# Patient Record
Sex: MALE | Race: WHITE | Employment: UNEMPLOYED | URBAN - METROPOLITAN AREA
[De-identification: names, ages, dates, MRNs, and addresses within clinical notes are randomized per-mention and may not be internally consistent; named-entity substitution may affect disease eponyms.]

---

## 2019-06-03 ENCOUNTER — OFFICE VISIT (OUTPATIENT)
Dept: ALLERGY | Facility: CLINIC | Age: 24
End: 2019-06-03
Payer: COMMERCIAL

## 2019-06-03 VITALS
WEIGHT: 215.63 LBS | TEMPERATURE: 98 F | BODY MASS INDEX: 26.81 KG/M2 | HEIGHT: 75 IN | OXYGEN SATURATION: 96 % | RESPIRATION RATE: 18 BRPM | DIASTOLIC BLOOD PRESSURE: 78 MMHG | SYSTOLIC BLOOD PRESSURE: 138 MMHG | HEART RATE: 49 BPM

## 2019-06-03 DIAGNOSIS — J30.1 SEASONAL ALLERGIC RHINITIS DUE TO POLLEN: ICD-10-CM

## 2019-06-03 DIAGNOSIS — J45.20 MILD INTERMITTENT EXTRINSIC ASTHMA WITHOUT COMPLICATION: Primary | ICD-10-CM

## 2019-06-03 PROCEDURE — 99212 OFFICE O/P EST SF 10 MIN: CPT | Performed by: ALLERGY & IMMUNOLOGY

## 2019-06-03 PROCEDURE — 99214 OFFICE O/P EST MOD 30 MIN: CPT | Performed by: ALLERGY & IMMUNOLOGY

## 2019-06-03 PROCEDURE — 94010 BREATHING CAPACITY TEST: CPT | Performed by: ALLERGY & IMMUNOLOGY

## 2019-06-03 RX ORDER — FLUTICASONE PROPIONATE 50 MCG
2 SPRAY, SUSPENSION (ML) NASAL DAILY
Qty: 3 BOTTLE | Refills: 3 | Status: SHIPPED | OUTPATIENT
Start: 2019-06-03

## 2019-06-03 RX ORDER — LEVALBUTEROL INHALATION SOLUTION 1.25 MG/3ML
1.25 SOLUTION RESPIRATORY (INHALATION) EVERY 4 HOURS PRN
Qty: 75 ML | Refills: 2 | Status: SHIPPED | OUTPATIENT
Start: 2019-06-03

## 2019-06-03 RX ORDER — ALBUTEROL SULFATE 90 UG/1
2 AEROSOL, METERED RESPIRATORY (INHALATION) EVERY 6 HOURS PRN
Qty: 3 INHALER | Refills: 0 | Status: SHIPPED | OUTPATIENT
Start: 2019-06-03 | End: 2019-08-30

## 2019-06-03 RX ORDER — BUDESONIDE 0.5 MG/2ML
0.5 INHALANT ORAL 2 TIMES DAILY
Qty: 60 AMPULE | Refills: 2 | Status: SHIPPED | OUTPATIENT
Start: 2019-06-03

## 2019-06-03 RX ORDER — LEVOCETIRIZINE DIHYDROCHLORIDE 5 MG/1
5 TABLET, FILM COATED ORAL NIGHTLY
Qty: 90 TABLET | Refills: 3 | Status: SHIPPED | OUTPATIENT
Start: 2019-06-03

## 2019-06-03 NOTE — PROGRESS NOTES
Pamella Her is a 20year old male. HPI:   Patient presents with: Allergies: Runny nose, congestion and sneezing    Patient is a 47-year-old male who presents for allergy evaluation with a chief complaint of allergies.     Review of records show juan daniel Grandfather         colon cancer   • Cancer Other         maternal side- stomach cancer      Social History: Social History    Tobacco Use      Smoking status: Never Smoker    Alcohol use: Yes    Drug use: Not on file       Medications (Active prior to tod HIVES      ROS:     Allergic/Immuno:  See HPI  Cardiovascular:  Negative for irregular heartbeat/palpitations, chest pain, edema  Constitutional:  Negative night sweats,weight loss, irritability and lethargy  Endocrine:  Negative for cold intolerance, poly Stable at this time. No active symptoms. Typical triggers can include upper respiratory infections and allergies.   No emergency room visits or prednisone in the interim    Currently using Xopenex and Pulmicort twice a day with upper respiratory infection 0     Sig: Inhale 2 puffs into the lungs every 6 (six) hours as needed for Wheezing. • Levocetirizine Dihydrochloride (XYZAL) 5 MG Oral Tab 90 tablet 3     Sig: Take 1 tablet (5 mg total) by mouth nightly.    • Fluticasone Propionate (FLONASE) 50 MCG/ACT

## 2019-06-03 NOTE — PATIENT INSTRUCTIONS
1. Asthma  Mild intermittent. Stable at this time. No active symptoms. Typical triggers can include upper respiratory infections and allergies.   No emergency room visits or prednisone in the interim    Currently using Xopenex and Pulmicort twice a day w

## 2019-08-30 NOTE — TELEPHONE ENCOUNTER
Refill requested for:   Name from pharmacy: [address] [place] [address] (200  PFS) 8.5G          Will file in chart as: PROAIR  (90 Base) MCG/ACT Inhalation Aero Soln    Sig: INHALE 2 PUFFS INTO THE LUNGS EVERY 6 HOURS AS NEEDED FOR WHEEZING    Disp:  25.5

## 2020-03-12 NOTE — TELEPHONE ENCOUNTER
Refill requested for   Name from pharmacy: [facility] (200  PFS) 8.5G          Will file in chart as: PROAIR  (90 Base) MCG/ACT Inhalation Aero Soln         Sig: INHALE 2 PUFFS INTO THE LUNGS EVERY 6 HOURS AS NEEDED FOR WHEEZING    Disp:  2

## 2020-03-12 NOTE — TELEPHONE ENCOUNTER
RN left voicemail requesting he call us back regarding his asthma status. RN provided call back number and office hours. Awaiting call back.

## 2020-03-12 NOTE — TELEPHONE ENCOUNTER
Mother called back in stating that patient is not having symptoms, she is requesting it for precautionary measures. She states that a call back is not necessary as long as refill is approved. Please advise.

## 2020-03-23 ENCOUNTER — TELEPHONE (OUTPATIENT)
Dept: ALLERGY | Facility: CLINIC | Age: 25
End: 2020-03-23

## 2020-03-23 NOTE — TELEPHONE ENCOUNTER
LM informing patient to give us a call back. We refilled RX on March 12, 2020. Will await call back on request to triage asthma symptoms.

## 2020-03-23 NOTE — TELEPHONE ENCOUNTER
Please call to inquire why patient is requesting albuterol.   Albuterol was refilled on March 12, 2020

## 2020-03-23 NOTE — TELEPHONE ENCOUNTER
Called and spoke with patient's mother, reports she did  the Albuterol issued 3/12/20 from [facility] to have one on hand for [place]. No active symptoms. They do not need any additional at this time.      Denial faxed to [facility] at [phone]-

## 2020-05-28 ENCOUNTER — HOSPITAL ENCOUNTER (OUTPATIENT)
Age: 25
Discharge: HOME | End: 2020-05-28
Attending: EMERGENCY MEDICINE
Payer: COMMERCIAL

## 2020-05-28 ENCOUNTER — APPOINTMENT (OUTPATIENT)
Dept: GENERAL RADIOLOGY | Age: 25
End: 2020-05-28
Attending: EMERGENCY MEDICINE
Payer: COMMERCIAL

## 2020-05-28 VITALS
OXYGEN SATURATION: 98 % | TEMPERATURE: 98 F | HEART RATE: 57 BPM | WEIGHT: 215.63 LBS | SYSTOLIC BLOOD PRESSURE: 134 MMHG | RESPIRATION RATE: 18 BRPM | BODY MASS INDEX: 27 KG/M2 | DIASTOLIC BLOOD PRESSURE: 77 MMHG

## 2020-05-28 DIAGNOSIS — S91.332A PUNCTURE WOUND OF PLANTAR ASPECT OF LEFT FOOT, INITIAL ENCOUNTER: Primary | ICD-10-CM

## 2020-05-28 PROCEDURE — 99213 OFFICE O/P EST LOW 20 MIN: CPT

## 2020-05-28 PROCEDURE — 90471 IMMUNIZATION ADMIN: CPT

## 2020-05-28 PROCEDURE — 99203 OFFICE O/P NEW LOW 30 MIN: CPT

## 2020-05-28 PROCEDURE — 73630 X-RAY EXAM OF FOOT: CPT | Performed by: EMERGENCY MEDICINE

## 2020-05-28 NOTE — ED PROVIDER NOTES
Patient Seen in: [address]      History   Patient presents with:  Laceration Abrasion    Stated Complaint: cut left foot on nail    HPI    Patient is a 58-year-old male with past history of asthma who presents now with a 26.95 kg/m²         Physical Exam    Constitutional: Well-developed well-nourished in no acute distress  Head: Normocephalic, no swelling or tenderness  Eyes: Nonicteric sclera, no conjunctival injection  Vascular: Easily palpable left posterior tibial pul

## 2020-06-03 ENCOUNTER — TELEPHONE (OUTPATIENT)
Dept: PODIATRY CLINIC | Facility: CLINIC | Age: 25
End: 2020-06-03

## 2020-06-03 ENCOUNTER — HOSPITAL ENCOUNTER (OUTPATIENT)
Age: 25
Discharge: HOME | End: 2020-06-03
Attending: EMERGENCY MEDICINE
Payer: COMMERCIAL

## 2020-06-03 VITALS
HEART RATE: 68 BPM | TEMPERATURE: 98 F | RESPIRATION RATE: 20 BRPM | SYSTOLIC BLOOD PRESSURE: 133 MMHG | OXYGEN SATURATION: 100 % | DIASTOLIC BLOOD PRESSURE: 65 MMHG

## 2020-06-03 DIAGNOSIS — Z51.89 VISIT FOR WOUND CHECK: Primary | ICD-10-CM

## 2020-06-03 PROCEDURE — 99214 OFFICE O/P EST MOD 30 MIN: CPT

## 2020-06-03 PROCEDURE — 99213 OFFICE O/P EST LOW 20 MIN: CPT

## 2020-06-03 RX ORDER — CLINDAMYCIN HYDROCHLORIDE 300 MG/1
300 CAPSULE ORAL 3 TIMES DAILY
Qty: 21 CAPSULE | Refills: 0 | Status: SHIPPED | OUTPATIENT
Start: 2020-06-03 | End: 2020-06-10

## 2020-06-03 NOTE — TELEPHONE ENCOUNTER
Pt was seen at [facility] today, has foot infection, requesting appt soon. Please call thank you.

## 2020-06-03 NOTE — TELEPHONE ENCOUNTER
Pt was seen at [address] [place] [address] on 05/28/20 for wound from stepping on a nail with left foot. Xrays taken. Pt declined abx from IC. Pt returned to [address] [place] [address] today for wound check on left foot. Drainage coming from foot. Given Clindamycin 300 mg. LMTCB on pt's home and cell #'s     -- Please schedule pt appt with [place] for tomorrow when pt calls back. Thank you!

## 2020-06-03 NOTE — ED PROVIDER NOTES
Patient Seen in: [address]      History   Patient presents with:  Wound Recheck    Stated Complaint: left foot laceration    HPI    Patient is a 49-year-old male with no significant past medical history presents now for Temp src Temporal   SpO2 100 %   O2 Device None (Room air)       Current:/65   Pulse 68   Temp 98.1 °F (36.7 °C) (Temporal)   Resp 20   SpO2 100%         Physical Exam    Constitutional: Well-developed well-nourished in no acute distress  Head: Nor R-0

## 2020-06-03 NOTE — ED INITIAL ASSESSMENT (HPI)
Here for wound check left plantar area, was seen here 1 week ago, states he feels fb sensation, no redness, no drainage

## 2020-08-05 ENCOUNTER — LAB ENCOUNTER (OUTPATIENT)
Dept: LAB | Facility: HOSPITAL | Age: 25
End: 2020-08-05
Attending: INTERNAL MEDICINE
Payer: COMMERCIAL

## 2020-08-05 LAB
C TRACH DNA SPEC QL NAA+PROBE: NEGATIVE
N GONORRHOEA DNA SPEC QL NAA+PROBE: NEGATIVE

## 2020-08-05 PROCEDURE — 36415 COLL VENOUS BLD VENIPUNCTURE: CPT

## 2020-08-05 PROCEDURE — 87591 N.GONORRHOEAE DNA AMP PROB: CPT

## 2020-08-05 PROCEDURE — 87491 CHLMYD TRACH DNA AMP PROBE: CPT

## 2020-08-05 PROCEDURE — 86695 HERPES SIMPLEX TYPE 1 TEST: CPT

## 2020-08-05 PROCEDURE — 87389 HIV-1 AG W/HIV-1&-2 AB AG IA: CPT

## 2020-08-05 PROCEDURE — 86696 HERPES SIMPLEX TYPE 2 TEST: CPT

## 2020-08-07 LAB
HSV 1 GLYCOPROTEIN G, IGG: NEGATIVE
HSV 2 GLYCOPROTEIN G, IGG: NEGATIVE

## 2020-08-26 DIAGNOSIS — Z20.822 SUSPECTED 2019 NOVEL CORONAVIRUS INFECTION: Primary | ICD-10-CM

## 2020-11-06 ENCOUNTER — HOSPITAL ENCOUNTER (OUTPATIENT)
Age: 25
Discharge: HOME | End: 2020-11-06
Attending: EMERGENCY MEDICINE
Payer: COMMERCIAL

## 2020-11-06 VITALS
RESPIRATION RATE: 16 BRPM | BODY MASS INDEX: 29.72 KG/M2 | TEMPERATURE: 99 F | DIASTOLIC BLOOD PRESSURE: 76 MMHG | SYSTOLIC BLOOD PRESSURE: 130 MMHG | OXYGEN SATURATION: 98 % | WEIGHT: 239 LBS | HEIGHT: 75 IN | HEART RATE: 76 BPM

## 2020-11-06 DIAGNOSIS — R30.0 DYSURIA: Primary | ICD-10-CM

## 2020-11-06 PROCEDURE — 99213 OFFICE O/P EST LOW 20 MIN: CPT | Performed by: EMERGENCY MEDICINE

## 2020-11-06 PROCEDURE — 81002 URINALYSIS NONAUTO W/O SCOPE: CPT | Performed by: EMERGENCY MEDICINE

## 2020-11-06 RX ORDER — PHENAZOPYRIDINE HYDROCHLORIDE 100 MG/1
100 TABLET, FILM COATED ORAL 3 TIMES DAILY PRN
Qty: 6 TABLET | Refills: 0 | Status: SHIPPED | OUTPATIENT
Start: 2020-11-06 | End: 2020-11-13

## 2020-11-07 NOTE — ED PROVIDER NOTES
Patient Seen in: [facility]    History   No chief complaint on file. Stated Complaint: URINARY COMPLAINT    HPI    Patient complains of urinary frequency, and dysuria that began 3 days ago.   Patient denies fever or chills, no vomiting, n 4 (four) hours as needed for Wheezing. Formaldehyde (FORMADON) 10 % Apply Externally Solution,  Apply  topically. Albuterol Sulfate HFA (PROAIR HFA) 108 (90 BASE) MCG/ACT Inhalation Aero Soln,  Inhale  into the lungs.    Levalbuterol HCl (XOPENEX) 1.25 Course     Labs Reviewed   TRICHOMONAS VAGINALIS BY [facility] POCT URINALYSIS DIPSTICK   URINE CULTURE, ROUTINE   CHLAMYDIA/GONOCOCCUS, GOLRY       MDM           MDM                         Disposition and Plan     Clinical Impression:  Dysuria  (primary enc

## 2020-11-16 DIAGNOSIS — Z20.828 SARS-ASSOCIATED CORONAVIRUS EXPOSURE: Primary | ICD-10-CM

## 2020-11-24 ENCOUNTER — TELEPHONE (OUTPATIENT)
Dept: ALLERGY | Facility: CLINIC | Age: 25
End: 2020-11-24

## 2020-11-24 NOTE — TELEPHONE ENCOUNTER
RN called patient's mother to notify her that patient has not been since since June 2019 and will need to be seen for any further refills per Dr. Jane Mathis protocol.     Patient's mother reports that he leaves for [place] on Friday and will be gone for 6 savi

## 2020-11-24 NOTE — TELEPHONE ENCOUNTER
Call reviewed and noted. Agree with triage advice provided. Patient will need follow-up appointment.   As patient was last seen in June 2019

## 2020-11-24 NOTE — TELEPHONE ENCOUNTER
Levalbuterol HCl 1.25 MG/3ML Inhalation Meeku Melecio      Patient mom calling and states son is leaving Friday morning and he have medication above but it is  and she trying to get more before he go       Please advise    [phone]    GERMÁN perera

## 2020-11-25 ENCOUNTER — APPOINTMENT (OUTPATIENT)
Dept: LAB | Age: 25
End: 2020-11-25
Attending: INTERNAL MEDICINE
Payer: COMMERCIAL

## 2020-11-25 DIAGNOSIS — Z20.828 SARS-ASSOCIATED CORONAVIRUS EXPOSURE: Primary | ICD-10-CM

## 2020-11-25 DIAGNOSIS — Z20.828 SARS-ASSOCIATED CORONAVIRUS EXPOSURE: ICD-10-CM

## 2021-08-31 ENCOUNTER — TELEPHONE (OUTPATIENT)
Dept: ALLERGY | Facility: CLINIC | Age: 26
End: 2021-08-31

## 2021-08-31 NOTE — TELEPHONE ENCOUNTER
Per mom of patient , patient has an appointment tomorrow @ 8:45AM but would like to know if patient can be seen earlier than that time due to patient work conflict, patient can not reschedule due to he is going out of town for 8 months and he needs to get

## 2021-08-31 NOTE — TELEPHONE ENCOUNTER
Spoke with mother of patient, who is on REJI. Verified patient's name and . Informed mother Dr. Preston Phillips does not have any early appointments for Wednesday, 21.  Mother verbalizes understanding and states patient will be at his 8:45 am appointment, no fu

## 2021-09-01 ENCOUNTER — OFFICE VISIT (OUTPATIENT)
Dept: ALLERGY | Facility: CLINIC | Age: 26
End: 2021-09-01
Payer: COMMERCIAL

## 2021-09-01 VITALS
SYSTOLIC BLOOD PRESSURE: 124 MMHG | OXYGEN SATURATION: 97 % | RESPIRATION RATE: 17 BRPM | DIASTOLIC BLOOD PRESSURE: 73 MMHG | HEART RATE: 64 BPM

## 2021-09-01 DIAGNOSIS — Z23 NEED FOR PROPHYLACTIC VACCINATION WITH STREPTOCOCCUS PNEUMONIAE (PNEUMOCOCCUS) AND INFLUENZA VACCINES: ICD-10-CM

## 2021-09-01 DIAGNOSIS — Z92.29 COVID-19 VACCINE SERIES COMPLETED: ICD-10-CM

## 2021-09-01 DIAGNOSIS — J30.1 SEASONAL ALLERGIC RHINITIS DUE TO POLLEN: ICD-10-CM

## 2021-09-01 DIAGNOSIS — J34.2 NASAL SEPTAL DEVIATION: ICD-10-CM

## 2021-09-01 DIAGNOSIS — J45.909 EXTRINSIC ASTHMA WITHOUT COMPLICATION, UNSPECIFIED ASTHMA SEVERITY, UNSPECIFIED WHETHER PERSISTENT: Primary | ICD-10-CM

## 2021-09-01 DIAGNOSIS — J30.81 ALLERGIC RHINITIS DUE TO ANIMALS: ICD-10-CM

## 2021-09-01 PROCEDURE — 3078F DIAST BP <80 MM HG: CPT | Performed by: ALLERGY & IMMUNOLOGY

## 2021-09-01 PROCEDURE — 3074F SYST BP LT 130 MM HG: CPT | Performed by: ALLERGY & IMMUNOLOGY

## 2021-09-01 PROCEDURE — 99214 OFFICE O/P EST MOD 30 MIN: CPT | Performed by: ALLERGY & IMMUNOLOGY

## 2021-09-01 RX ORDER — LEVALBUTEROL INHALATION SOLUTION 1.25 MG/3ML
1 SOLUTION RESPIRATORY (INHALATION) EVERY 4 HOURS PRN
Qty: 60 EACH | Refills: 2 | Status: SHIPPED | OUTPATIENT
Start: 2021-09-01

## 2021-09-01 RX ORDER — FLUTICASONE PROPIONATE 50 MCG
2 SPRAY, SUSPENSION (ML) NASAL DAILY
Qty: 3 EACH | Refills: 3 | Status: SHIPPED | OUTPATIENT
Start: 2021-09-01

## 2021-09-01 RX ORDER — LEVOCETIRIZINE DIHYDROCHLORIDE 5 MG/1
5 TABLET, FILM COATED ORAL NIGHTLY
Qty: 90 TABLET | Refills: 3 | Status: SHIPPED | OUTPATIENT
Start: 2021-09-01

## 2021-09-01 RX ORDER — BUDESONIDE 0.5 MG/2ML
0.5 INHALANT ORAL 2 TIMES DAILY
Qty: 60 EACH | Refills: 2 | Status: SHIPPED | OUTPATIENT
Start: 2021-09-01

## 2021-09-01 RX ORDER — ALBUTEROL SULFATE 90 UG/1
2 AEROSOL, METERED RESPIRATORY (INHALATION) EVERY 6 HOURS PRN
Qty: 3 EACH | Refills: 1 | Status: SHIPPED | OUTPATIENT
Start: 2021-09-01

## 2021-09-01 NOTE — PATIENT INSTRUCTIONS
1. Asthma  No ED visits or prednisone in the interim.   Denies symptoms more than 2 days/week  Triggers include upper respiratory infections for which he uses budesonide 0.5 mg twice a day    Albuterol 2 puffs every 4-6 hours as needed  Budesonide 0.5 mg at

## 2021-09-01 NOTE — PROGRESS NOTES
Shorty Boyd is a 27year old male. HPI:   Patient presents with:  Asthma: pt reports his asthma is well controlled. Pt denies using daily inhalers. Pt uses rescue inhaler and nebulizer PRN. He last used in Dec 2019.  Pt denies current flares  Allergi Mother    • Other (Other) Mother         \"family cancer syndrome\"- recommended annual colonoscopy for mom   • Diabetes Other         mggf   • Heart Disease Other         mggf   • Cancer Maternal Grandfather         colon cancer   • Cancer Other         m Wheezing. (Patient not taking: Reported on 9/1/2021 ) 75 mL 2   • budesonide 0.5 MG/2ML Inhalation Suspension Take 2 mL (0.5 mg total) by nebulization 2 (two) times daily.  (Patient not taking: Reported on 9/1/2021 ) 60 ampule 2   • budesonide (PULMICORT) 0 membranes are normal bilaterally hearing is grossly intact  Nose/Mouth/Throat: nose and throat are clear mucous membranes are moist  + NSD   Neck/Thyroid: neck is supple without adenopathy  Lymphatic: no abnormal cervical, supraclavicular or axillary adeno asthma. Patient to consider but defers at this time       Orders This Visit:  No orders of the defined types were placed in this encounter.       Meds This Visit:  Requested Prescriptions     Signed Prescriptions Disp Refills   • budesonide 0.5 MG/2ML Tahira Cormier

## 2022-06-21 ENCOUNTER — LAB ENCOUNTER (OUTPATIENT)
Dept: LAB | Facility: HOSPITAL | Age: 27
End: 2022-06-21
Attending: INTERNAL MEDICINE
Payer: COMMERCIAL

## 2022-06-21 DIAGNOSIS — Z00.01 ENCOUNTER FOR GENERAL ADULT MEDICAL EXAMINATION WITH ABNORMAL FINDINGS: Primary | ICD-10-CM

## 2022-06-21 LAB
ALBUMIN SERPL-MCNC: 4.2 G/DL (ref 3.4–5)
ALBUMIN/GLOB SERPL: 1.2 {RATIO} (ref 1–2)
ALP LIVER SERPL-CCNC: 66 U/L
ALT SERPL-CCNC: 36 U/L
ANION GAP SERPL CALC-SCNC: 9 MMOL/L (ref 0–18)
AST SERPL-CCNC: 44 U/L (ref 15–37)
BASOPHILS # BLD AUTO: 0.04 X10(3) UL (ref 0–0.2)
BASOPHILS NFR BLD AUTO: 0.7 %
BILIRUB SERPL-MCNC: 1.4 MG/DL (ref 0.1–2)
BILIRUB UR QL: NEGATIVE
BUN BLD-MCNC: 18 MG/DL (ref 7–18)
BUN/CREAT SERPL: 16.1 (ref 10–20)
CALCIUM BLD-MCNC: 9.6 MG/DL (ref 8.5–10.1)
CHLORIDE SERPL-SCNC: 103 MMOL/L (ref 98–112)
CHOLEST SERPL-MCNC: 175 MG/DL (ref ?–200)
CLARITY UR: CLEAR
CO2 SERPL-SCNC: 28 MMOL/L (ref 21–32)
COLOR UR: YELLOW
CREAT BLD-MCNC: 1.12 MG/DL
DEPRECATED RDW RBC AUTO: 42 FL (ref 35.1–46.3)
EOSINOPHIL # BLD AUTO: 0.18 X10(3) UL (ref 0–0.7)
EOSINOPHIL NFR BLD AUTO: 3.1 %
ERYTHROCYTE [DISTWIDTH] IN BLOOD BY AUTOMATED COUNT: 12.6 % (ref 11–15)
FASTING PATIENT LIPID ANSWER: YES
FASTING STATUS PATIENT QL REPORTED: YES
GLOBULIN PLAS-MCNC: 3.5 G/DL (ref 2.8–4.4)
GLUCOSE BLD-MCNC: 89 MG/DL (ref 70–99)
GLUCOSE UR-MCNC: NEGATIVE MG/DL
HCT VFR BLD AUTO: 44 %
HDLC SERPL-MCNC: 87 MG/DL (ref 40–59)
HGB BLD-MCNC: 15 G/DL
IMM GRANULOCYTES # BLD AUTO: 0.02 X10(3) UL (ref 0–1)
IMM GRANULOCYTES NFR BLD: 0.3 %
LDLC SERPL CALC-MCNC: 79 MG/DL (ref ?–100)
LEUKOCYTE ESTERASE UR QL STRIP.AUTO: NEGATIVE
LYMPHOCYTES # BLD AUTO: 1.9 X10(3) UL (ref 1–4)
LYMPHOCYTES NFR BLD AUTO: 32.6 %
MCH RBC QN AUTO: 31 PG (ref 26–34)
MCHC RBC AUTO-ENTMCNC: 34.1 G/DL (ref 31–37)
MCV RBC AUTO: 90.9 FL
MONOCYTES # BLD AUTO: 0.72 X10(3) UL (ref 0.1–1)
MONOCYTES NFR BLD AUTO: 12.4 %
NEUTROPHILS # BLD AUTO: 2.96 X10 (3) UL (ref 1.5–7.7)
NEUTROPHILS # BLD AUTO: 2.96 X10(3) UL (ref 1.5–7.7)
NEUTROPHILS NFR BLD AUTO: 50.9 %
NITRITE UR QL STRIP.AUTO: NEGATIVE
NONHDLC SERPL-MCNC: 88 MG/DL (ref ?–130)
OSMOLALITY SERPL CALC.SUM OF ELEC: 291 MOSM/KG (ref 275–295)
PH UR: 5.5 [PH] (ref 5–8)
PLATELET # BLD AUTO: 193 10(3)UL (ref 150–450)
POTASSIUM SERPL-SCNC: 4 MMOL/L (ref 3.5–5.1)
PROT SERPL-MCNC: 7.7 G/DL (ref 6.4–8.2)
RBC # BLD AUTO: 4.84 X10(6)UL
SODIUM SERPL-SCNC: 140 MMOL/L (ref 136–145)
SP GR UR STRIP: >=1.03 (ref 1–1.03)
TRIGL SERPL-MCNC: 45 MG/DL (ref 30–149)
TSI SER-ACNC: 2.37 MIU/ML (ref 0.36–3.74)
UROBILINOGEN UR STRIP-ACNC: 0.2
VLDLC SERPL CALC-MCNC: 7 MG/DL (ref 0–30)
WBC # BLD AUTO: 5.8 X10(3) UL (ref 4–11)

## 2022-06-21 PROCEDURE — 80053 COMPREHEN METABOLIC PANEL: CPT

## 2022-06-21 PROCEDURE — 84443 ASSAY THYROID STIM HORMONE: CPT

## 2022-06-21 PROCEDURE — 85025 COMPLETE CBC W/AUTO DIFF WBC: CPT

## 2022-06-21 PROCEDURE — 36415 COLL VENOUS BLD VENIPUNCTURE: CPT

## 2022-06-21 PROCEDURE — 80061 LIPID PANEL: CPT

## 2024-08-27 ENCOUNTER — LAB ENCOUNTER (OUTPATIENT)
Dept: LAB | Facility: HOSPITAL | Age: 29
End: 2024-08-27
Attending: INTERNAL MEDICINE

## 2024-08-27 DIAGNOSIS — Z00.00 ROUTINE GENERAL MEDICAL EXAMINATION AT A HEALTH CARE FACILITY: Primary | ICD-10-CM

## 2024-08-27 LAB
ALBUMIN SERPL-MCNC: 4.6 G/DL (ref 3.2–4.8)
ALBUMIN/GLOB SERPL: 1.7 {RATIO} (ref 1–2)
ALP LIVER SERPL-CCNC: 73 U/L
ALT SERPL-CCNC: 13 U/L
ANION GAP SERPL CALC-SCNC: 4 MMOL/L (ref 0–18)
AST SERPL-CCNC: 20 U/L (ref ?–34)
BASOPHILS # BLD AUTO: 0.04 X10(3) UL (ref 0–0.2)
BASOPHILS NFR BLD AUTO: 0.9 %
BILIRUB SERPL-MCNC: 3.3 MG/DL (ref 0.3–1.2)
BILIRUB UR QL: NEGATIVE
BUN BLD-MCNC: 11 MG/DL (ref 9–23)
BUN/CREAT SERPL: 9.9 (ref 10–20)
CALCIUM BLD-MCNC: 9.9 MG/DL (ref 8.7–10.4)
CHLORIDE SERPL-SCNC: 105 MMOL/L (ref 98–112)
CHOLEST SERPL-MCNC: 169 MG/DL (ref ?–200)
CLARITY UR: CLEAR
CO2 SERPL-SCNC: 30 MMOL/L (ref 21–32)
COLOR UR: COLORLESS
CREAT BLD-MCNC: 1.11 MG/DL
DEPRECATED RDW RBC AUTO: 41.7 FL (ref 35.1–46.3)
EGFRCR SERPLBLD CKD-EPI 2021: 91 ML/MIN/1.73M2 (ref 60–?)
EOSINOPHIL # BLD AUTO: 0.24 X10(3) UL (ref 0–0.7)
EOSINOPHIL NFR BLD AUTO: 5.2 %
ERYTHROCYTE [DISTWIDTH] IN BLOOD BY AUTOMATED COUNT: 12.7 % (ref 11–15)
FASTING PATIENT LIPID ANSWER: YES
FASTING STATUS PATIENT QL REPORTED: YES
GLOBULIN PLAS-MCNC: 2.7 G/DL (ref 2–3.5)
GLUCOSE BLD-MCNC: 86 MG/DL (ref 70–99)
GLUCOSE UR-MCNC: NORMAL MG/DL
HCT VFR BLD AUTO: 43.1 %
HDLC SERPL-MCNC: 70 MG/DL (ref 40–59)
HGB BLD-MCNC: 15.5 G/DL
HGB UR QL STRIP.AUTO: NEGATIVE
IMM GRANULOCYTES # BLD AUTO: 0.01 X10(3) UL (ref 0–1)
IMM GRANULOCYTES NFR BLD: 0.2 %
KETONES UR-MCNC: NEGATIVE MG/DL
LDLC SERPL CALC-MCNC: 88 MG/DL (ref ?–100)
LEUKOCYTE ESTERASE UR QL STRIP.AUTO: NEGATIVE
LYMPHOCYTES # BLD AUTO: 1.73 X10(3) UL (ref 1–4)
LYMPHOCYTES NFR BLD AUTO: 37.7 %
MCH RBC QN AUTO: 32.1 PG (ref 26–34)
MCHC RBC AUTO-ENTMCNC: 36 G/DL (ref 31–37)
MCV RBC AUTO: 89.2 FL
MONOCYTES # BLD AUTO: 0.5 X10(3) UL (ref 0.1–1)
MONOCYTES NFR BLD AUTO: 10.9 %
NEUTROPHILS # BLD AUTO: 2.07 X10 (3) UL (ref 1.5–7.7)
NEUTROPHILS # BLD AUTO: 2.07 X10(3) UL (ref 1.5–7.7)
NEUTROPHILS NFR BLD AUTO: 45.1 %
NITRITE UR QL STRIP.AUTO: NEGATIVE
NONHDLC SERPL-MCNC: 99 MG/DL (ref ?–130)
OSMOLALITY SERPL CALC.SUM OF ELEC: 287 MOSM/KG (ref 275–295)
PH UR: 6 [PH] (ref 5–8)
PLATELET # BLD AUTO: 124 10(3)UL (ref 150–450)
PLATELETS.RETICULATED NFR BLD AUTO: 6 % (ref 0–7)
POTASSIUM SERPL-SCNC: 4 MMOL/L (ref 3.5–5.1)
PROT SERPL-MCNC: 7.3 G/DL (ref 5.7–8.2)
PROT UR-MCNC: NEGATIVE MG/DL
RBC # BLD AUTO: 4.83 X10(6)UL
SODIUM SERPL-SCNC: 139 MMOL/L (ref 136–145)
SP GR UR STRIP: 1.01 (ref 1–1.03)
TRIGL SERPL-MCNC: 56 MG/DL (ref 30–149)
TSI SER-ACNC: 1.52 MIU/ML (ref 0.55–4.78)
UROBILINOGEN UR STRIP-ACNC: NORMAL
VLDLC SERPL CALC-MCNC: 9 MG/DL (ref 0–30)
WBC # BLD AUTO: 4.6 X10(3) UL (ref 4–11)

## 2024-08-27 PROCEDURE — 80061 LIPID PANEL: CPT

## 2024-08-27 PROCEDURE — 85025 COMPLETE CBC W/AUTO DIFF WBC: CPT

## 2024-08-27 PROCEDURE — 81003 URINALYSIS AUTO W/O SCOPE: CPT

## 2024-08-27 PROCEDURE — 84443 ASSAY THYROID STIM HORMONE: CPT

## 2024-08-27 PROCEDURE — 36415 COLL VENOUS BLD VENIPUNCTURE: CPT

## 2024-08-27 PROCEDURE — 80053 COMPREHEN METABOLIC PANEL: CPT

## 2025-02-03 ENCOUNTER — OFFICE VISIT (OUTPATIENT)
Dept: ALLERGY | Facility: CLINIC | Age: 30
End: 2025-02-03
Payer: COMMERCIAL

## 2025-02-03 VITALS — HEIGHT: 75 IN | WEIGHT: 220 LBS | BODY MASS INDEX: 27.35 KG/M2

## 2025-02-03 DIAGNOSIS — J30.89 SEASONAL AND PERENNIAL ALLERGIC RHINOCONJUNCTIVITIS: ICD-10-CM

## 2025-02-03 DIAGNOSIS — Z23 FLU VACCINE NEED: ICD-10-CM

## 2025-02-03 DIAGNOSIS — Z23 NEED FOR COVID-19 VACCINE: ICD-10-CM

## 2025-02-03 DIAGNOSIS — J45.20 MILD INTERMITTENT EXTRINSIC ASTHMA WITHOUT COMPLICATION (HCC): ICD-10-CM

## 2025-02-03 DIAGNOSIS — Z88.0 PENICILLIN ALLERGY: ICD-10-CM

## 2025-02-03 DIAGNOSIS — J45.990 EXERCISE INDUCED BRONCHOSPASM (HCC): Primary | ICD-10-CM

## 2025-02-03 DIAGNOSIS — J30.2 SEASONAL AND PERENNIAL ALLERGIC RHINOCONJUNCTIVITIS: ICD-10-CM

## 2025-02-03 DIAGNOSIS — H10.10 SEASONAL AND PERENNIAL ALLERGIC RHINOCONJUNCTIVITIS: ICD-10-CM

## 2025-02-03 LAB
FEF 25-75%: 4.09 L/S
FET: 12.1 S
FEV1/FVC: 0.78
FEV1: 4.9 L
FVC: 6.29 L
PEF: 943 L/MIN

## 2025-02-03 RX ORDER — CLARITHROMYCIN 500 MG/1
500 TABLET ORAL EVERY 12 HOURS
COMMUNITY
Start: 2025-01-29 | End: 2025-02-03

## 2025-02-03 RX ORDER — ALBUTEROL SULFATE 90 UG/1
2 INHALANT RESPIRATORY (INHALATION) ONCE
Status: DISCONTINUED | OUTPATIENT
Start: 2025-02-03 | End: 2025-02-03

## 2025-02-03 RX ORDER — BUDESONIDE 0.5 MG/2ML
INHALANT ORAL
Qty: 60 EACH | Refills: 5 | Status: SHIPPED | OUTPATIENT
Start: 2025-02-03

## 2025-02-03 RX ORDER — ALBUTEROL SULFATE 90 UG/1
2 INHALANT RESPIRATORY (INHALATION) EVERY 4 HOURS PRN
Qty: 3 EACH | Refills: 0 | Status: SHIPPED | OUTPATIENT
Start: 2025-02-03

## 2025-02-03 RX ORDER — AZITHROMYCIN 250 MG/1
TABLET, FILM COATED ORAL
COMMUNITY
Start: 2024-12-13

## 2025-02-03 RX ORDER — ALBUTEROL SULFATE 0.83 MG/ML
2.5 SOLUTION RESPIRATORY (INHALATION) EVERY 4 HOURS PRN
Qty: 30 EACH | Refills: 0 | Status: SHIPPED | OUTPATIENT
Start: 2025-02-03

## 2025-02-03 RX ORDER — DOXYCYCLINE 100 MG/1
TABLET ORAL 2 TIMES DAILY
COMMUNITY
Start: 2025-01-31

## 2025-02-03 NOTE — PATIENT INSTRUCTIONS
#1 asthma  Exercise-induced component.  No ED visits or prednisone in the interim.  Albuterol every 4-6 hours as needed.  Patient uses budesonide 0.5 mg twice a day at the start of an upper respiratory infection  Continue with current regimen.  Patient can be posted if using albuterol outside of exercise more than 2 days/week  See above spirometry to assess lung function    #2 allergic rhinitis  Xyzal 5 g once a day as an antihistamine if having significant runny nose sneezing itchy watery eyes  Flonase 2 sprays per nostril once a day if having prominent nasal congestion or postnasal drip  Reviewed avoidance measures and potential treatment option immunotherapy    #3 nasal septal deviation.  Able to breathe through his nose at this time.  Professional .  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    #4 penicillin allergy  Check serum IgE to penicillin V and penicillin G.  Prior reaction as a child.  No recollection.  Prior rash per report.  Reviewed 80% chance of outgrowing a penicillin allergy.  Penicillin is testing is negative then may consider oral challenge to further evaluate.    #5 acute urticaria.  Prior upper respiratory infection with fevers and wheezing.  Treated initially with Biaxin.  Started with rash while on Biaxin and then transition to doxycycline.  Continue to avoid Biaxin at this time.  In my opinion may try doxycycline again in the future.  See above #4 to reevaluate for penicillin allergy

## 2025-02-03 NOTE — PROGRESS NOTES
Jaspreet Mesa is a 29 year old male.    HPI:     Chief Complaint   Patient presents with    Asthma     Patient states he was sick last week-had wheezing, cough, had fever for a few days- used Albuterol for a few days.patient states he was given two antibiotics and broke out in hives.     Patient is a 29-year-old male who presents for allergy consultation upon referral of his PCP with a chief complaint of allergies and asthma  Review of records show patient last seen by me in September 2000 21/3+ years ago for asthma allergic rhinitis.  Prior skin testing to environmental allergies was positive to trees ragweed mold dust mite cat dog and cockroach    Immunizations reviewed COVID-vaccine x 2 doses last in June 2021      No flu vaccine on record for the past year    Today patient reports    Asthma  Active or persistent symptoms > 2 days per week    ED visits or prednisone in the interim: denies   Active meds:  alb or xopenex  prn    Needs refills on nebs       Ar:  Active or persistent symptoms  Active meds: xyzal prn seasonally , flonase prn   pets  1 dog       History of penicillin allergy, child     Biaxin 1/29/25:  for uri last week fever, cough, wz   hives 1-2 days after starting abx   Changed to doxcycline 1/31/25 , rash spred to hips and back       HISTORY:  Past Medical History:    Acute pansinusitis    Allergy    amox    Allergy    Bactrim    Asthma    admitted @ [facility]    Chronic rhinitis    Extrinsic asthma, unspecified    Pneumonia    right lower lobe/hospitalized x 2 days    Pneumothorax    bilateral- icu @ [facility] x 2 days    VARICELLA      Past Surgical History:   Procedure Laterality Date    Repair sliding inguinal hernia  09/03/97-10/23/97    right/ surgery by Dr. Gonzalez      Family History   Problem Relation Age of Onset    Allergies Mother         penicillin    Other (Hay Fever) Mother     Other (Other) Mother         \"family cancer syndrome\"- recommended annual colonoscopy for mom    Diabetes Other          mggf    Heart Disease Other         mggf    Cancer Maternal Grandfather         colon cancer    Cancer Other         maternal side- stomach cancer      Social History:   Social History     Socioeconomic History    Marital status: Single   Tobacco Use    Smoking status: Never     Passive exposure: Never    Smokeless tobacco: Never   Vaping Use    Vaping status: Never Used   Substance and Sexual Activity    Alcohol use: Yes    Drug use: Yes     Types: Cannabis        Medications (Active prior to today's visit):  Current Outpatient Medications   Medication Sig Dispense Refill    azithromycin 250 MG Oral Tab TK 2 TS PO ON DAY 1, THEN TK 1 T PO D FOR 4 DAYS      Albuterol Sulfate HFA (PROAIR HFA) 108 (90 Base) MCG/ACT Inhalation Aero Soln Inhale 2 puffs into the lungs every 6 (six) hours as needed for Wheezing. 3 each 1    Respiratory Therapy Supplies (NEBULIZER/ADULT MASK) Does not apply Kit Dispense 1 neb kit with tubing mask. 1 kit 0    budesonide (PULMICORT) 0.5 MG/2ML Inhalation Suspension USE ONE VIAL IN NEBULIZER EVERY DAY AS DIRECTED 60 mL 3    clarithromycin 500 MG Oral Tab Take 1 tablet (500 mg total) by mouth Q12H. FOR 10 DAYS (Patient not taking: Reported on 2/3/2025)      Doxycycline Monohydrate 100 MG Oral Tab Take by mouth 2 (two) times daily. (Patient not taking: Reported on 2/3/2025)      levocetirizine (XYZAL) 5 MG Oral Tab Take 1 tablet (5 mg total) by mouth nightly. (Patient not taking: Reported on 2/3/2025) 90 tablet 3    Fluticasone Propionate (FLONASE) 50 MCG/ACT Nasal Suspension 2 sprays by Nasal route daily. 3 each 3    PROAIR  (90 Base) MCG/ACT Inhalation Aero Soln INHALE 2 PUFFS INTO THE LUNGS EVERY 6 HOURS AS NEEDED FOR WHEEZING (Patient not taking: No sig reported) 25.5 g 0    Levalbuterol HCl 1.25 MG/3ML Inhalation Nebu Soln Take 3 mL (1.25 mg total) by nebulization every 4 (four) hours as needed for Wheezing. (Patient not taking: Reported on 9/1/2021 ) 75 mL 2    Fluticasone  Propionate (FLONASE) 50 MCG/ACT Nasal Suspension 2 sprays by Nasal route daily. (Patient not taking: Reported on 2/3/2025) 3 Bottle 3    Fluticasone Propionate (FLONASE) 50 MCG/ACT Nasal Suspension 2 sprays by Nasal route daily. (Patient not taking: Reported on 9/1/2021 ) 1 Bottle 11    Levalbuterol HCl (XOPENEX) 1.25 MG/3ML Inhalation Nebu Soln Take 3 mL by nebulization every 4 (four) hours as needed for Wheezing. (Patient not taking: Reported on 9/1/2021 ) 1 each 3    Formaldehyde (FORMADON) 10 % Apply Externally Solution Apply  topically. (Patient not taking: Reported on 9/1/2021)      Albuterol Sulfate HFA (PROAIR HFA) 108 (90 BASE) MCG/ACT Inhalation Aero Soln Inhale  into the lungs. (Patient not taking: Reported on 9/1/2021)      Levalbuterol HCl (XOPENEX) 1.25 MG/3ML Inhalation Nebu Soln Inhale  into the lungs. (Patient not taking: Reported on 9/1/2021 )         Allergies:  Allergies[1]      ROS:     Allergic/Immuno:  See HPI  Cardiovascular:  Negative for irregular heartbeat/palpitations, chest pain, edema  Constitutional:  Negative night sweats,weight loss, irritability and lethargy  Endocrine:  Negative for cold intolerance, polydipsia and polyphagia  ENMT:  Negative for ear drainage, hearing loss and nasal drainage  Eyes:  Negative for eye discharge and vision loss  Gastrointestinal:  Negative for abdominal pain, diarrhea and vomiting  Genitourinary:  Negative for dysuria and hematuria  Hema/Lymph:  Negative for easy bleeding and easy bruising  Integumentary:  Negative for pruritus and rash  Musculoskeletal:  Negative for joint symptoms  Neurological:  Negative for dizziness, seizures  Psychiatric:  Negative for inappropriate interaction and psychiatric symptoms  Respiratory:  Negative for cough, dyspnea and wheezing      PHYSICAL EXAM:   Constitutional: responsive, no acute distress noted  Head/Face: NC/Atraumatic  Eyes/Vision: conjunctiva and lids are normal extraocular motion is intact    Ears/Audiometry: tympanic membranes are normal bilaterally hearing is grossly intact  Nose/Mouth/Throat: nose and throat are clear mucous membranes are moist   Neck/Thyroid: neck is supple without adenopathy  Lymphatic: no abnormal cervical, supraclavicular or axillary adenopathy is noted  Respiratory: normal to inspection lungs are clear to auscultation bilaterally normal respiratory effort   Cardiovascular: regular rate and rhythm no murmurs, gallups, or rubs  Abdomen: soft non-tender non-distended  Skin/Hair: no unusual rashes present  Extremities: no edema, cyanosis, or clubbing  Neurological:Oriented to time, place, person & situation       ASSESSMENT/PLAN:   Assessment   Encounter Diagnoses   Name Primary?    Exercise induced bronchospasm (HCC) Yes    Seasonal and perennial allergic rhinoconjunctivitis     Flu vaccine need     Need for COVID-19 vaccine     Penicillin allergy        Spirometry today shows an FEV1 99%  and % which is normal with an FEV1 FVC ratio being normal as well.  Normal spirometry.  Post deferred    #1 asthma  Exercise-induced component.  No ED visits or prednisone in the interim.  Albuterol every 4-6 hours as needed.  Patient uses budesonide 0.5 mg twice a day at the start of an upper respiratory infection  Continue with current regimen.  Patient can be posted if using albuterol outside of exercise more than 2 days/week  See above spirometry to assess lung function    #2 allergic rhinitis  Xyzal 5 g once a day as an antihistamine if having significant runny nose sneezing itchy watery eyes  Flonase 2 sprays per nostril once a day if having prominent nasal congestion or postnasal drip  Reviewed avoidance measures and potential treatment option immunotherapy    #3 nasal septal deviation.  Able to breathe through his nose at this time.  Professional .  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    #4 penicillin allergy  Check serum IgE to  penicillin V and penicillin G.  Prior reaction as a child.  No recollection.  Prior rash per report.  Reviewed 80% chance of outgrowing a penicillin allergy.  Penicillin is testing is negative then may consider oral challenge to further evaluate.    #5 acute urticaria.  Prior upper respiratory infection with fevers and wheezing.  Treated initially with Biaxin.  Started with rash while on Biaxin and then transition to doxycycline.  Continue to avoid Biaxin at this time.  In my opinion may try doxycycline again in the future.  See above #4 to reevaluate for penicillin allergy         Orders This Visit:  No orders of the defined types were placed in this encounter.      Meds This Visit:  Requested Prescriptions      No prescriptions requested or ordered in this encounter       Imaging & Referrals:  None     2/3/2025  Ki Stockton MD      If medication samples were provided today, they were provided solely for patient education and training related to self administration of these medications.  Teaching, instruction and sample was provided to the patient by myself.  Teaching included  a review of potential adverse side effects as well as potential efficacy.  Patient's questions were answered in regards to medication administration and dosing and potential side effects. Teaching was provided via the teach back method         [1]   Allergies  Allergen Reactions    Mold Spores     Penicillins UNKNOWN    Sulfa Antibiotics HIVES

## 2025-06-23 ENCOUNTER — TELEPHONE (OUTPATIENT)
Dept: ALLERGY | Facility: CLINIC | Age: 30
End: 2025-06-23

## 2025-06-23 NOTE — TELEPHONE ENCOUNTER
Labs from 02/3/2025 have not been completed.   Letter sent home.   Postponed x 2 months.     Dr. Stockton, if labs have not been completed in that time okay to cancel?

## (undated) NOTE — LETTER
6/23/2025          Jaspreet Mesa    [address] [facility] [facility] [address]         Dear Jaspreet,    Our records indicate that the tests ordered for you by Ki Stockton MD  have not been done.  If you have, in fact, already completed the tests or you do not wish to have the tests done, please contact our office at THE NUMBER LISTED BELOW.  Otherwise, please proceed with the testing.        Sincerely,    Ki Stockton MD  [place] [address] [address] [facility] [id number] [phone]